# Patient Record
Sex: MALE | Race: WHITE
[De-identification: names, ages, dates, MRNs, and addresses within clinical notes are randomized per-mention and may not be internally consistent; named-entity substitution may affect disease eponyms.]

---

## 2018-06-28 ENCOUNTER — HOSPITAL ENCOUNTER (EMERGENCY)
Dept: HOSPITAL 68 - ERH | Age: 28
End: 2018-06-28
Payer: COMMERCIAL

## 2018-06-28 VITALS — HEIGHT: 74 IN | WEIGHT: 152 LBS | BODY MASS INDEX: 19.51 KG/M2

## 2018-06-28 VITALS — SYSTOLIC BLOOD PRESSURE: 122 MMHG | DIASTOLIC BLOOD PRESSURE: 78 MMHG

## 2018-06-28 DIAGNOSIS — R10.30: Primary | ICD-10-CM

## 2018-06-28 LAB
ABSOLUTE GRANULOCYTE CT: 1.6 /CUMM (ref 1.4–6.5)
BASOPHILS # BLD: 0 /CUMM (ref 0–0.2)
BASOPHILS NFR BLD: 0.4 % (ref 0–2)
EOSINOPHIL # BLD: 0 /CUMM (ref 0–0.7)
EOSINOPHIL NFR BLD: 0 % (ref 0–5)
ERYTHROCYTE [DISTWIDTH] IN BLOOD BY AUTOMATED COUNT: 13.8 % (ref 11.5–14.5)
GRANULOCYTES NFR BLD: 16.2 % (ref 42.2–75.2)
HCT VFR BLD CALC: 43.5 % (ref 42–52)
LYMPHOCYTES # BLD: 7.1 /CUMM (ref 1.2–3.4)
MCH RBC QN AUTO: 28.7 PG (ref 27–31)
MCHC RBC AUTO-ENTMCNC: 33.6 G/DL (ref 33–37)
MCV RBC AUTO: 85.3 FL (ref 80–94)
MONOCYTES # BLD: 1.3 /CUMM (ref 0.1–0.6)
PLATELET # BLD: 215 /CUMM (ref 130–400)
PMV BLD AUTO: 8.8 FL (ref 7.4–10.4)
RED BLOOD CELL CT: 5.1 /CUMM (ref 4.7–6.1)
WBC # BLD AUTO: 10 /CUMM (ref 4.8–10.8)

## 2018-06-28 NOTE — CT SCAN REPORT
EXAMINATION:
CT ABDOMEN AND PELVIS WITH CONTRAST
 
CLINICAL INFORMATION:
Abdominal pain to rule out appendicitis.
 
COMPARISON:
None available.
 
TECHNIQUE:
Multidetector volumetric imaging was performed of the abdomen and pelvis
following IV administration of 95 mL of Optiray 320 intravenous contrast.
Sagittal and coronal reformatted images were obtained on the technologist's
workstation.
 
FINDINGS:
There is a 7 mm nodule within the right lower lobe on image 6 of series 2 and
there are a few 2 mm nodules at the left lung base on image 10 of series 2. A
nonemergent chest CT is recommended for complete evaluation and to establish
further imaging recommendations.
 
The liver, spleen, adrenal glands, gallbladder, and pancreas are normal.
 
The kidneys exhibit symmetric nephrograms without evidence of hydronephrosis
or nephrolithiasis. No focal renal lesions.
 
There are nonspecific fluid-filled small bowel loops throughout the abdomen
that could be seen in the setting of an underlying enteritis.  The large and
small bowel are normal in caliber without evidence of mechanical obstruction.
It is very difficult to identify the appendix secondary to closely opposed
bowel loops within the lower abdomen however a structure most likely
reflecting the appendix is normal in size. The appendix is normal. There is
no free air and there is no intra-abdominal free fluid. No mesenteric or
retroperitoneal adenopathy.
 
The pelvic viscera are normal. No pelvic adenopathy. No free fluid within the
pelvis.
 
There are no acute osseous abnormalities. No significant soft tissue
abnormality.
 
IMPRESSION:
- There are nonspecific fluid-filled small bowel loops throughout the abdomen
that could be seen in the setting of an underlying enteritis. There is no
bowel obstruction.
 
- It is very difficult to identify the appendix secondary to closely opposed
bowel loops within the lower abdomen however a structure most likely
reflecting the appendix is normal in size. No inflammatory changes within the
right lower quadrant.
 
- There is a 7 mm nodule within the right lower lobe on image 6 of series 2
and there are a few 2 mm nodules at the left lung base on image 10 of series
2. A nonemergent chest CT is recommended for complete evaluation and to
establish further imaging recommendations.

## 2018-06-28 NOTE — ED GENERAL ADULT
**See Addendum**
History of Present Illness
 
General
Chief Complaint: General Adult
Stated Complaint: STOMACH PAIN/CONSTIPATION X5 DAYS
Source: patient
Exam Limitations: no limitations
 
Vital Signs & Intake/Output
Vital Signs & Intake/Output
 Vital Signs
 
 
Date Time Temp Pulse Resp B/P B/P Pulse O2 O2 Flow FiO2
 
     Mean Ox Delivery Rate 
 
 1732      98 Room Air  
 
 1542 100.1 98 20 134/85  96 Room Air  
 
 
 
Allergies
Coded Allergies:
No Known Allergies (18)
 
Triage Note:
PT TO ED C/O ABD PAIN X 5 DAYS WITH CONTSTIPATION.
 HAS NOT TRIED OTC MEDS. DENIES N/V. PAIN IS LOWER
 ABD "ALL BLOCKED UP".
Triage Nurses Notes Reviewed? yes
Onset: Abrupt
Duration: day(s):
Timing: recent history
HPI:
 
 
 
18
5 PM
28-year-old male presents to the emergency department complaining of lower 
abdominal pain and constipation.  He denies any vomiting.  He has a past medical
history of hernia repair as a child.
 
Past History
 
Travel History
Traveled to [place] past 21 day No
 
Medical History
Any Pertinent Medical History? see below for history
 
Surgical History
Surgical History: HERNIA REPAIR
 
Psychosocial History
What is your primary language English
Tobacco Use: Current Daily Use
Daily Tobacco Use Amount/Type: => 5 Cigarettes daily
ETOH Use: denies use
Illicit Drug Use: denies illicit drug use
 
Family History
Hx Contributory? No
 
Review of Systems
 
Review of Systems
Constitutional:
Denies: fever. 
EENTM:
Reports: no symptoms. 
Respiratory:
Denies: short of breath. 
Cardiovascular:
Denies: chest pain. 
GI:
Reports: see HPI, abdominal pain, constipation. 
Genitourinary:
Reports: no symptoms. 
Musculoskeletal:
Reports: no symptoms. 
Skin:
Reports: no symptoms. 
Neurological/Psychological:
Reports: no symptoms. 
Hematologic/Endocrine:
Reports: no symptoms. 
Immunologic/Allergic:
Reports: no symptoms. 
 
Physical Exam
 
Physical Exam
General Appearance: well developed/nourished, alert
Head: atraumatic, normal appearance
Eyes:
Bilateral: normal appearance, PERRL, EOMI. 
Ears, Nose, Throat: normal pharynx, normal ENT inspection, hearing grossly 
normal
Neck: normal inspection, supple, full range of motion
Respiratory: chest non-tender, no respiratory distress
Cardiovascular: regular rate/rhythm
Peripheral Pulses:
4+ radial (R), 4+ radial (L)
Gastrointestinal: tenderness
Back: normal range of motion
Extremities: normal range of motion
Neurologic/Psych: no motor/sensory deficits, awake, alert, oriented x 3
Skin: intact, normal color, warm/dry
 
Core Measures
ACS in differential dx? No
CVA/TIA Diagnosis: No
Sepsis Present: No
Sepsis Focused Exam Completed? No
 
Progress
Differential Diagnoses
I considered the following diagnoses in my evaluation of the patient: [Bowel 
obstruction, constipation, irritable bowel disease, appendicitis]
 
Plan of Care:
 Orders
 
 
Procedure Date/time Status
 
Add-on Test (ER Only) 1814 Active
 
MONOSPOT TEST  165 Complete
 
COMPREHENSIVE METABOLIC PANEL  164 Complete
 
CBC WITHOUT DIFFERENTIAL  164 Complete
 
 
 Laboratory Tests
 
 
 
18 1658:
Anion Gap 9, Estimated GFR > 60, BUN/Creatinine Ratio 12.2, Glucose 107  H, 
Calcium 9.3, Total Bilirubin 1.0,   H,   H, Alkaline Phosphatase 
346  H, Total Protein 6.8, Albumin 4.1, Globulin 2.7, Albumin/Globulin Ratio 1.5
, CBC w Diff MAN DIFF ORDERED, RBC 5.10, MCV 85.3, MCH 28.7, MCHC 33.6, RDW 13.8
, MPV 8.8, Gran % 16.2  L, Lymphocytes % 70.8  H, Monocytes % 12.6  H, 
Eosinophils % 0, Basophils % 0.4, Absolute Granulocytes 1.6, Segmented 
Neutrophils 14  L, Absolute Lymphocytes 7.1  H, Lymphocytes 78  H, Monocytes 8, 
Absolute Monocytes 1.3  H, Absolute Eosinophils 0, Absolute Basophils 0, 
Platelet Estimate ADEQUATE, Normocytic RBCs VERIFIED, Normochromic RBCs VERIFIED
, Stomatocytes FEW, Infectious Mono Titer POSITIVE
 
Initial ED EKG: none
 
Departure
 
Departure
Disposition: STILL A PATIENT
Condition: Stable
Clinical Impression
Primary Impression: Abdominal pain
Referrals:
Patient Has No Primary Care Dr (PCP/Family)
 
Departure Forms:
Customer Survey
General Discharge Information
Comments
 
CT Scan is negative for evidence of appendicitis.  Patient feels better.  Labs 
are consistent with mononucleosis.  Discussed the findings on the CT of 
pulmonary nodules.  He will follow these up with the primary care doctor.
 
 
 
 
 
The patient was treated with IV fluids and IV Tylenol.  CT scan of the abdomen 
and pelvis was ordered.
 
 
PATIENT: FREDY JACKSON  MEDICAL RECORD NO: [id number]
PRESENT AGE: 28  PATIENT ACCOUNT NO: [id number]
: 88  LOCATION: [facility]
ORDERING PHYSICIAN: Brennon Kenny DO  
 
  SERVICE DATE: 
EXAM TYPE: CAT - CT ABD & PELVIS W IV CONTRAST
 
EXAMINATION:
CT ABDOMEN AND PELVIS WITH CONTRAST
 
CLINICAL INFORMATION:
Abdominal pain to rule out appendicitis.
 
COMPARISON:
None available.
 
TECHNIQUE:
Multidetector volumetric imaging was performed of the abdomen and pelvis
following IV administration of 95 mL of Optiray 320 intravenous contrast.
Sagittal and coronal reformatted images were obtained on the technologist's
workstation.
 
FINDINGS:
There is a 7 mm nodule within the right lower lobe on image 6 of series 2 and
there are a few 2 mm nodules at the left lung base on image 10 of series 2. A
nonemergent chest CT is recommended for complete evaluation and to establish
further imaging recommendations.
 
The liver, spleen, adrenal glands, gallbladder, and pancreas are normal.
 
The kidneys exhibit symmetric nephrograms without evidence of hydronephrosis
or nephrolithiasis. No focal renal lesions.
 
There are nonspecific fluid-filled small bowel loops throughout the abdomen
that could be seen in the setting of an underlying enteritis.  The large and
small bowel are normal in caliber without evidence of mechanical obstruction.
It is very difficult to identify the appendix secondary to closely opposed
bowel loops within the lower abdomen however a structure most likely
reflecting the appendix is normal in size. The appendix is normal. There is
no free air and there is no intra-abdominal free fluid. No mesenteric or
retroperitoneal adenopathy.
 
The pelvic viscera are normal. No pelvic adenopathy. No free fluid within the
pelvis.
 
There are no acute osseous abnormalities. No significant soft tissue
abnormality.
 
IMPRESSION:
- There are nonspecific fluid-filled small bowel loops throughout the abdomen
that could be seen in the setting of an underlying enteritis. There is no
bowel obstruction.
 
- It is very difficult to identify the appendix secondary to closely opposed
bowel loops within the lower abdomen however a structure most likely
reflecting the appendix is normal in size. No inflammatory changes within the
right lower quadrant.
 
- There is a 7 mm nodule within the right lower lobe on image 6 of series 2
and there are a few 2 mm nodules at the left lung base on image 10 of series
2. A nonemergent chest CT is recommended for complete evaluation and to
establish further imaging recommendations.
 
 
DICTATED BY: Brennon Oneil MD 
DATE/TIME DICTATED:18
:[facility] 
DATE/TIME TRANSCRIBED:18 / 1822
 
CONFIDENTIAL, DO NOT COPY WITHOUT APPROPRIATE AUTHORIZATION.
 
 <Electronically signed in Other Vendor System>                                 
          
                                           SIGNED BY: Brennon Oneil MD 18
1831
 
 
 
 
Critical Care Note
 
Critical Care Note
Critical Care Time: non-applicable